# Patient Record
Sex: FEMALE | Race: WHITE
[De-identification: names, ages, dates, MRNs, and addresses within clinical notes are randomized per-mention and may not be internally consistent; named-entity substitution may affect disease eponyms.]

---

## 2018-09-18 ENCOUNTER — HOSPITAL ENCOUNTER (OUTPATIENT)
Dept: HOSPITAL 31 - C.ENDO | Age: 48
Discharge: HOME | End: 2018-09-18
Attending: SPECIALIST
Payer: COMMERCIAL

## 2018-09-18 VITALS — BODY MASS INDEX: 34.1 KG/M2

## 2018-09-18 VITALS — TEMPERATURE: 97.5 F | OXYGEN SATURATION: 98 %

## 2018-09-18 VITALS — SYSTOLIC BLOOD PRESSURE: 106 MMHG | DIASTOLIC BLOOD PRESSURE: 67 MMHG | HEART RATE: 64 BPM | RESPIRATION RATE: 12 BRPM

## 2018-09-18 DIAGNOSIS — D12.2: Primary | ICD-10-CM

## 2018-09-18 DIAGNOSIS — K64.8: ICD-10-CM

## 2018-09-18 PROCEDURE — 82948 REAGENT STRIP/BLOOD GLUCOSE: CPT

## 2018-09-18 PROCEDURE — 45380 COLONOSCOPY AND BIOPSY: CPT

## 2018-09-18 PROCEDURE — 84703 CHORIONIC GONADOTROPIN ASSAY: CPT

## 2018-09-18 PROCEDURE — 88305 TISSUE EXAM BY PATHOLOGIST: CPT

## 2018-09-18 NOTE — CP.SDSHP
Same Day Surgery [facility]





- History


Proposed Procedure: COLONSCOPY


Pre-Op Diagnosis: SEE NOTES





- Previous Medical/Surgical History


Endocrine/Metabolic: Diabetes, Other


Neuro: Backaches


Misc: Other


Pain: 4.Moderate Pain





- Allergies


Allergies: 


Allergies





No Known Allergies Allergy (Verified 09/18/18 07:19)


 











- Physical Exam


General Appearance: N


Vital Signs: 


 Vital Signs











  09/18/18





  07:40


 


Temperature 97.6 F


 


Pulse Rate 64


 


Respiratory 18





Rate 


 


Blood Pressure 118/67


 


O2 Sat by Pulse 98





Oximetry 











Mental Status: Alert & Oriented x3


Neuro: WNL


Heart: Other


Lungs: WNL


GI: Other





- {Optional Preform as Required}


Breast: WNL


Abdomen: Other


Rectal: Other


Integument: WNL


: WNL


Ortho: Other


ENT: WNL





- Impression


Pt. Evaluated Today:Candidate for Anesthesia & Procedure: Yes





- Date & Time


Time: 08:08





Short Stay Discharge





- Short Stay Discharge


Admitting Diagnosis/Reason for Visit: RECTAL BLEEDING


Disposition: HOME/ ROUTINE

## 2020-09-04 ENCOUNTER — PREPPED CHART (OUTPATIENT)
Dept: URBAN - METROPOLITAN AREA CLINIC 110 | Facility: CLINIC | Age: 50
End: 2020-09-04

## 2020-09-04 PROBLEM — H52.13 MYOPIA: Noted: 2020-09-04

## 2020-09-04 PROBLEM — E11.9 DIABETES, TYPE II, NO OCULAR COMPLICATIONS: Noted: 2020-09-04

## 2020-09-04 PROBLEM — H25.13 NUCLEAR SCLEROSIS: Noted: 2020-09-04

## 2022-12-12 ENCOUNTER — NEW REFERRAL (OUTPATIENT)
Dept: URBAN - METROPOLITAN AREA CLINIC 51 | Facility: CLINIC | Age: 52
End: 2022-12-12

## 2022-12-12 DIAGNOSIS — G45.3: ICD-10-CM

## 2022-12-12 DIAGNOSIS — E11.9: ICD-10-CM

## 2022-12-12 DIAGNOSIS — H35.013: ICD-10-CM

## 2022-12-12 DIAGNOSIS — H43.393: ICD-10-CM

## 2022-12-12 PROCEDURE — 92202 OPSCPY EXTND ON/MAC DRAW: CPT

## 2022-12-12 PROCEDURE — 99204 OFFICE O/P NEW MOD 45 MIN: CPT

## 2022-12-12 PROCEDURE — 92134 CPTRZ OPH DX IMG PST SGM RTA: CPT

## 2022-12-12 ASSESSMENT — TONOMETRY
OS_IOP_MMHG: 15
OD_IOP_MMHG: 16

## 2022-12-12 ASSESSMENT — VISUAL ACUITY
OS_PH: 20/30
OD_PH: 20/30
OS_SC: 20/40-1
OD_SC: 20/40